# Patient Record
(demographics unavailable — no encounter records)

---

## 2025-04-14 NOTE — DISCUSSION/SUMMARY
[de-identified] : 48yo female presents with fracture of distal phalanx of first toe   X-rays and diagnosis discussed with patient  Placed in a short cam boot for support  Use of cryothrerapy discussed  3 days worth of hydrocodone-acetaminoiphen 5-323 rx sent - side effects reviewed - instructed how to take medication  Follow-up with foot specialist in 1-2 weeks

## 2025-04-14 NOTE — HISTORY OF PRESENT ILLNESS
[7] : 7 [Rest] : rest [Full time] : Work status: full time [de-identified] :  04/14/2025: MARIYA YUAN is a 49-year-old female here with LT foot pain starting last night from her foot being caught and reports that she heard a snap/pop. Tred Advil with no relief. No prior HX.  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

## 2025-04-14 NOTE — IMAGING
[de-identified] : Left foot exam:  Ecchymosis and abrasion to first toe Nail bed intact Able to wiggle toes Tenderness to palpation to first toe  NVID   [Left] : left foot [Weight -] : weightbearing [Fracture] : Fracture

## 2025-04-14 NOTE — IMAGING
[de-identified] : Left foot exam:  Ecchymosis and abrasion to first toe Nail bed intact Able to wiggle toes Tenderness to palpation to first toe  NVID   [Left] : left foot [Weight -] : weightbearing [Fracture] : Fracture

## 2025-04-14 NOTE — HISTORY OF PRESENT ILLNESS
[7] : 7 [Rest] : rest [Full time] : Work status: full time [de-identified] :  04/14/2025: MARIYA YUAN is a 49-year-old female here with LT foot pain starting last night from her foot being caught and reports that she heard a snap/pop. Tred Advil with no relief. No prior HX.  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

## 2025-04-14 NOTE — DISCUSSION/SUMMARY
[de-identified] : 50yo female presents with fracture of distal phalanx of first toe   X-rays and diagnosis discussed with patient  Placed in a short cam boot for support  Use of cryothrerapy discussed  3 days worth of hydrocodone-acetaminoiphen 5-323 rx sent - side effects reviewed - instructed how to take medication  Follow-up with foot specialist in 1-2 weeks

## 2025-04-24 NOTE — HISTORY OF PRESENT ILLNESS
[Dull/Aching] : dull/aching [Sharp] : sharp [de-identified] : 04/24/2025: caught great toe in pant leg 1.5 wks ago w/ pain/swelling. went to [facility] walking in boot. no prior foot probs. rona oscar/tob. teacher [] : Post Surgical Visit: no [FreeTextEntry1] : L foot

## 2025-04-24 NOTE — ASSESSMENT
[FreeTextEntry1] : wbat ice/elevate post op shoe/toe spacer nsaids prn-has naproxen already requesting rx for vicodin -- advised would not prescribe opioids this far from injury limit activity until pain resolves f/up 1 month w/ toe xray  The patient's current medication management of their orthopedic diagnosis was reviewed today:  (1) We discussed a comprehensive treatment plan that included possible pharmaceutical management involving the use of prescription strength medications including but not limited to options such as oral Naprosyn 500mg BID, once daily Meloxicam 15 mg, or 500-650 mg Tylenol versus over the counter oral medications and topical prescription NSAID Pennsaid vs over the counter Voltaren gel. (2) There is a moderate risk of morbidity with further treatment, especially from use of prescription strength medications and possible side effects of these medications which include upset stomach with oral medications, skin reactions to topical medications and cardiac/renal issues with long term use. (3) I recommended that the patient follow-up with their medical physician to discuss any significant specific potential issues with long term medication use such as interactions with current medications or with exacerbation of underlying medical comorbidities. (4) The benefits and risks associated with use of injectable, oral or topical, prescription and over the counter anti-inflammatory medications were discussed with the patient. The patient voiced understanding of the risks including but not limited to bleeding, stroke, kidney dysfunction, heart disease, and were referred to the black box warning label for further information.

## 2025-04-24 NOTE — PHYSICAL EXAM
[Left] : left foot and ankle [Mild] : mild swelling of toe(s) [1st] : 1st [NL (40)] : plantar flexion 40 degrees [NL 30)] : inversion 30 degrees [NL (20)] : eversion 20 degrees [5___] : [facility] 5[unfilled]/5 [2+] : dorsalis pedis pulse: 2+ [] : patient ambulates without assistive device

## 2025-04-24 NOTE — DATA REVIEWED
[Outside X-rays] : outside x-rays [Left] : left [Foot] : foot [I reviewed the films/CD and additionally noted] : I reviewed the films/CD and additionally noted [FreeTextEntry1] : great toe distal phalanx fx